# Patient Record
Sex: FEMALE | Race: OTHER | URBAN - METROPOLITAN AREA
[De-identification: names, ages, dates, MRNs, and addresses within clinical notes are randomized per-mention and may not be internally consistent; named-entity substitution may affect disease eponyms.]

---

## 2024-10-29 ENCOUNTER — OFFICE VISIT (OUTPATIENT)
Dept: OBGYN CLINIC | Facility: CLINIC | Age: 34
End: 2024-10-29
Payer: COMMERCIAL

## 2024-10-29 VITALS
WEIGHT: 146.19 LBS | DIASTOLIC BLOOD PRESSURE: 88 MMHG | BODY MASS INDEX: 26.9 KG/M2 | SYSTOLIC BLOOD PRESSURE: 135 MMHG | HEIGHT: 62 IN

## 2024-10-29 DIAGNOSIS — Z01.419 NORMAL GYNECOLOGIC EXAMINATION: Primary | ICD-10-CM

## 2024-10-29 PROCEDURE — 99395 PREV VISIT EST AGE 18-39: CPT | Performed by: OBSTETRICS & GYNECOLOGY

## 2024-10-29 PROCEDURE — 3079F DIAST BP 80-89 MM HG: CPT | Performed by: OBSTETRICS & GYNECOLOGY

## 2024-10-29 PROCEDURE — 3075F SYST BP GE 130 - 139MM HG: CPT | Performed by: OBSTETRICS & GYNECOLOGY

## 2024-10-29 PROCEDURE — 3008F BODY MASS INDEX DOCD: CPT | Performed by: OBSTETRICS & GYNECOLOGY

## 2024-10-29 RX ORDER — NORETHINDRONE ACETATE AND ETHINYL ESTRADIOL 1MG-20(21)
1 KIT ORAL DAILY
COMMUNITY
Start: 2023-10-16

## 2024-10-29 RX ORDER — PNV 85/IRON/FOLIC/DHA/FISH OIL 40-10-1 MG
1 CAPSULE ORAL DAILY
Qty: 90 CAPSULE | Refills: 2 | Status: SHIPPED | OUTPATIENT
Start: 2024-10-29 | End: 2024-11-28

## 2024-10-29 RX ORDER — NORETHINDRONE ACETATE AND ETHINYL ESTRADIOL 1MG-20(21)
1 KIT ORAL DAILY
Qty: 84 TABLET | Refills: 4 | Status: SHIPPED | OUTPATIENT
Start: 2024-10-29 | End: 2025-10-29

## 2024-10-29 NOTE — PROGRESS NOTES
Marie Naidu is a 34 year old female  No LMP recorded (lmp unknown). (Menstrual status: Continuous Pill).   Chief Complaint   Patient presents with    Annual     Per pt needs OCP refill       OBSTETRICS HISTORY:     OB History    Para Term  AB Living   5 4 4   1 4   SAB IAB Ectopic Multiple Live Births           2      # Outcome Date GA Lbr Pete/2nd Weight Sex Type Anes PTL Lv   5 Term 13    F NORMAL SPONT      4 Term 11    M NORMAL SPONT      3 Term 08    F NORMAL SPONT   BUTCH   2 Term 07    M NORMAL SPONT   BUTCH   1 AB 06 12w0d             Birth Comments: bleeding       GYNE HISTORY:     Hx Prior Abnormal Pap: Yes   Use of Birth Control (if yes, specify type): OCP (10/29/2024  8:57 AM)  Hx Prior Abnormal Pap: Yes (10/29/2024  8:57 AM)         No data to display                  MEDICAL HISTORY:     History reviewed. No pertinent past medical history.    SURGICAL HISTORY:     Past Surgical History:   Procedure Laterality Date    Plastic surgery - external         SOCIAL HISTORY:     Social History     Socioeconomic History    Marital status: Single   Tobacco Use    Smoking status: Never     Passive exposure: Never    Smokeless tobacco: Never        FAMILY HISTORY:     History reviewed. No pertinent family history.    MEDICATIONS:       Current Outpatient Medications:     BLISOVI FE  1-20 MG-MCG Oral Tab, Take 1 tablet by mouth daily., Disp: , Rfl:     Norethin Ace-Eth Estrad-FE (BLISOVI FE ) 1-20 MG-MCG Oral Tab, Take 1 tablet by mouth daily., Disp: 84 tablet, Rfl: 4    Cvesne-IpSbg-HlRcnXn-FA-Fish (OB COMPLETE ONE) 50-1-476 MG Oral Cap, Take 1 tablet by mouth daily., Disp: 90 capsule, Rfl: 2    ALLERGIES:     Allergies[1]      REVIEW OF SYSTEMS:     Constitutional:    denies fever / chills  Eyes:     denies blurred or double vision  Cardiovascular:  denies chest pain or palpitations  Respiratory:    denies shortness of breath  Gastrointestinal:  denies  severe abdominal pain, frequent diarrhea or constipation, nausea / vomiting  Genitourinary:    denies dysuria, bothersome incontinence  Skin/Breast:   denies any breast pain, lumps, or discharge  Neurological:    denies frequent severe headaches  Psychiatric:   denies depression or anxiety, thoughts of harming self or others  Heme/Lymph:    denies easy bruising or bleeding      PHYSICAL EXAM:   Blood pressure 135/88, height 5' 2\" (1.575 m), weight 146 lb 3.2 oz (66.3 kg).  Constitutional:  well developed, well nourished  Head/Face:  normocephalic  Neck/Thyroid: thyroid symmetric, no thyromegaly, no nodules, no adenopathy  Lymphatic: no abnormal supraclavicular or axillary adenopathy is noted  Respiratory:      chest wall symmetric and nontender on palpation, clear to asculation bilateral, no wheezing, rales, ronchi, and resonance normal upon percussion  Cardiovascular: chest normal in appearance, regular rate and rhythm, no murmurs, PMI palpated midclavicular line  Breast:   normal bilaterally without palpable masses, tenderness, asymmetry, nipple discharge, nipple retraction or skin changes bilaterally  Abdomen:   soft, nontender, nondistended, no masses  Skin/Hair:  no unusual rashes or bruises  Extremities:  no edema, no cyanosis, non tender bilaterally  Psychiatric:   oriented to time, place, person and situation. Appropriate mood and affect    Pelvic Exam:  External Genitalia:  normal appearance, hair distribution, and no lesions  Urethral Meatus:   normal in size, location, without lesions   Bladder:    no fullness, masses or tenderness  Vagina:    normal appearance without lesions, no abnormal discharge  Cervix:    No lesions, normal friability   Uterus:    normal in size, 8 wk sized, normal contour, position, mobility, without  motion tenderness  Adnexa:   normal without masses or tenderness  Perineum:   normal  Anus: no hemorroids         ASSESSMENT & PLAN:     Marie was seen today for annual.    Diagnoses  and all orders for this visit:    Normal gynecologic examination  -     Norethin Ace-Eth Estrad-FE (BLISOVI FE 1/20) 1-20 MG-MCG Oral Tab; Take 1 tablet by mouth daily.  -     ThinPrep Pap with HPV Reflex, Chlamydia/GC; Future  -     Chlamydia/Gc Amplification; Future  -     Osqpmy-UgCgg-NiYtmBw-FA-Fish (OB COMPLETE ONE) 50-1-476 MG Oral Cap; Take 1 tablet by mouth daily.    Nutrition, weight screening and exercise were discussed with the patient.  Exercise should encompass approximately 150 minutes/week.  Self breast exam counseling was also given.  I advised the patient to avoid tobacco, drugs and alcohol.  Influenza vaccine was offered if seasonally appropriate.  HPV and STD prevention counseling was given.  Health maintenance checklist  was reviewed including Pap smear, cervical cultures, and mammogram screening if age-appropriate.  Appropriate follow-up scheduling was discussed with the patient.    Ocp refilled. Pnv if trying for pregnancy      FOLLOW-UP     Return in about 1 year (around 10/29/2025) for Annual exam.      Rivera Montero MD  10/29/2024       [1] Not on File

## 2024-10-30 LAB
C TRACH DNA SPEC QL NAA+PROBE: NEGATIVE
N GONORRHOEA DNA SPEC QL NAA+PROBE: NEGATIVE

## 2024-11-01 LAB — HPV E6+E7 MRNA CVX QL NAA+PROBE: NEGATIVE

## 2024-12-05 DIAGNOSIS — Z01.419 NORMAL GYNECOLOGIC EXAMINATION: ICD-10-CM

## 2024-12-06 RX ORDER — NORETHINDRONE ACETATE AND ETHINYL ESTRADIOL 1MG-20(21)
1 KIT ORAL DAILY
Qty: 84 TABLET | Refills: 4 | OUTPATIENT
Start: 2024-12-06 | End: 2025-12-06

## 2024-12-06 RX ORDER — NORETHINDRONE ACETATE AND ETHINYL ESTRADIOL 1MG-20(21)
1 KIT ORAL DAILY
Qty: 28 TABLET | Refills: 0 | OUTPATIENT
Start: 2024-12-06

## 2025-02-10 ENCOUNTER — LAB ENCOUNTER (OUTPATIENT)
Dept: LAB | Facility: HOSPITAL | Age: 35
End: 2025-02-10
Attending: OBSTETRICS & GYNECOLOGY
Payer: COMMERCIAL

## 2025-02-10 ENCOUNTER — OFFICE VISIT (OUTPATIENT)
Dept: OBGYN CLINIC | Facility: CLINIC | Age: 35
End: 2025-02-10
Payer: COMMERCIAL

## 2025-02-10 VITALS
DIASTOLIC BLOOD PRESSURE: 83 MMHG | BODY MASS INDEX: 27.42 KG/M2 | SYSTOLIC BLOOD PRESSURE: 130 MMHG | WEIGHT: 149 LBS | HEIGHT: 62 IN | HEART RATE: 72 BPM

## 2025-02-10 DIAGNOSIS — B37.31 VAGINAL CANDIDIASIS: Primary | ICD-10-CM

## 2025-02-10 DIAGNOSIS — B37.31 VAGINAL CANDIDIASIS: ICD-10-CM

## 2025-02-10 LAB
EST. AVERAGE GLUCOSE BLD GHB EST-MCNC: 97 MG/DL (ref 68–126)
HBA1C MFR BLD: 5 % (ref ?–5.7)

## 2025-02-10 PROCEDURE — 3008F BODY MASS INDEX DOCD: CPT | Performed by: OBSTETRICS & GYNECOLOGY

## 2025-02-10 PROCEDURE — 99212 OFFICE O/P EST SF 10 MIN: CPT | Performed by: OBSTETRICS & GYNECOLOGY

## 2025-02-10 PROCEDURE — 36415 COLL VENOUS BLD VENIPUNCTURE: CPT

## 2025-02-10 PROCEDURE — 3079F DIAST BP 80-89 MM HG: CPT | Performed by: OBSTETRICS & GYNECOLOGY

## 2025-02-10 PROCEDURE — 3075F SYST BP GE 130 - 139MM HG: CPT | Performed by: OBSTETRICS & GYNECOLOGY

## 2025-02-10 PROCEDURE — 83036 HEMOGLOBIN GLYCOSYLATED A1C: CPT

## 2025-02-10 RX ORDER — TERCONAZOLE 4 MG/G
1 CREAM VAGINAL NIGHTLY
Qty: 7 EACH | Refills: 0 | Status: SHIPPED | OUTPATIENT
Start: 2025-02-10 | End: 2025-02-17

## 2025-02-10 NOTE — PROGRESS NOTES
Marie Naidu is a 35 year old female  Patient's last menstrual period was 2025.   Chief Complaint   Patient presents with    Gyn Exam     Pt here for vaginal discharge thick yellowish discharge for about 2 days   C/o discharge    OBSTETRICS HISTORY:     OB History    Para Term  AB Living   5 4 4   1 4   SAB IAB Ectopic Multiple Live Births           2      # Outcome Date GA Lbr Pete/2nd Weight Sex Type Anes PTL Lv   5 Term 13    F NORMAL SPONT      4 Term 11    M NORMAL SPONT      3 Term 08    F NORMAL SPONT   BUTCH   2 Term 07    M NORMAL SPONT   BUTCH   1 AB 06 12w0d             Birth Comments: bleeding       GYNE HISTORY:     Hx Prior Abnormal Pap: No  Pap Date: 10/29/24  Pap Result Notes: wnl   Use of Birth Control (if yes, specify type): OCP (2/10/2025 10:34 AM)  Hx Prior Abnormal Pap: No (2/10/2025 10:34 AM)  Pap Date: 10/29/24 (2/10/2025 10:34 AM)  Pap Result Notes: wnl (2/10/2025 10:34 AM)        Latest Ref Rng & Units 10/29/2024    11:16 AM   RECENT PAP RESULTS   INTERPRETATION/RESULT: Negative for intraepithelial lesion or malignancy Negative for intraepithelial lesion or malignancy-Reactive/Other changes    HPV Negative Negative          MEDICAL HISTORY:     History reviewed. No pertinent past medical history.    SURGICAL HISTORY:     Past Surgical History:   Procedure Laterality Date    Plastic surgery - external         SOCIAL HISTORY:     Social History     Socioeconomic History    Marital status: Single   Tobacco Use    Smoking status: Never     Passive exposure: Never    Smokeless tobacco: Never   Vaping Use    Vaping status: Never Used   Substance and Sexual Activity    Alcohol use: Never    Drug use: Never    Sexual activity: Yes        FAMILY HISTORY:     History reviewed. No pertinent family history.    MEDICATIONS:       Current Outpatient Medications:     teraconazole 0.4 % Vaginal Cream, Place 1 applicator vaginally nightly for 7 days.,  Disp: 7 each, Rfl: 0    BLISOVI FE 1/20 1-20 MG-MCG Oral Tab, Take 1 tablet by mouth daily., Disp: , Rfl:     ALLERGIES:     Allergies[1]      REVIEW OF SYSTEMS:     Constitutional:    denies fever / chills  Eyes:     denies blurred or double vision  Cardiovascular:  denies chest pain or palpitations  Respiratory:    denies shortness of breath  Gastrointestinal:  denies severe abdominal pain, frequent diarrhea or constipation, nausea / vomiting  Genitourinary:    denies dysuria, bothersome incontinence  Skin/Breast:   denies any breast pain, lumps, or discharge  Neurological:    denies frequent severe headaches  Psychiatric:   denies depression or anxiety, thoughts of harming self or others  Heme/Lymph:    denies easy bruising or bleeding      PHYSICAL EXAM:   Blood pressure 130/83, pulse 72, height 5' 2\" (1.575 m), weight 149 lb (67.6 kg), last menstrual period 01/28/2025, not currently breastfeeding.  Constitutional:  well developed, well nourished  Head/Face:  normocephalic  Neck/Thyroid: thyroid symmetric, no thyromegaly, no nodules, no adenopathy  Lymphatic: no abnormal supraclavicular or axillary adenopathy is noted  Respiratory:      chest wall symmetric and nontender on palpation, clear to asculation bilateral, no wheezing, rales, ronchi, and resonance normal upon percussion  Cardiovascular: chest normal in appearance, regular rate and rhythm, no murmurs, PMI palpated midclavicular line  Breast:   normal bilaterally without palpable masses, tenderness, asymmetry, nipple discharge, nipple retraction or skin changes bilaterally  Abdomen:   soft, nontender, nondistended, no masses  Skin/Hair:  no unusual rashes or bruises  Extremities:  no edema, no cyanosis, non tender bilaterally  Psychiatric:   oriented to time, place, person and situation. Appropriate mood and affect    Pelvic Exam:  External Genitalia:  normal appearance, hair distribution, and no lesions  Urethral Meatus:   normal in size, location,  without lesions   Bladder:    no fullness, masses or tenderness  Vagina:    normal appearance without lesions, copious thick white adherent discharge, ph <4.5  Cervix:    No lesions, normal friability         ASSESSMENT & PLAN:     Marie was seen today for gyn exam.    Diagnoses and all orders for this visit:    Vaginal candidiasis  -     Hemoglobin A1C; Future  -     teraconazole 0.4 % Vaginal Cream; Place 1 applicator vaginally nightly for 7 days.      F/u 3 wks rpt sse  Recent glucose 115, so A1C    FOLLOW-UP     Return in about 3 weeks (around 3/3/2025) for gyn prblm.      Rivera Montero MD  2/10/2025       [1] Not on File